# Patient Record
Sex: FEMALE | Race: WHITE
[De-identification: names, ages, dates, MRNs, and addresses within clinical notes are randomized per-mention and may not be internally consistent; named-entity substitution may affect disease eponyms.]

---

## 2017-02-28 ENCOUNTER — HOSPITAL ENCOUNTER (OUTPATIENT)
Dept: HOSPITAL 47 - WWCWWP | Age: 37
Discharge: HOME | End: 2017-02-28
Payer: COMMERCIAL

## 2017-02-28 DIAGNOSIS — Z12.31: Primary | ICD-10-CM

## 2017-02-28 PROCEDURE — 77063 BREAST TOMOSYNTHESIS BI: CPT

## 2017-02-28 NOTE — [facility]
DATE OF SERVICE:  02/28/2017 



CHIEF COMPLAINT: The patient is here for her routine gynecologic exam 

and mammogram.  



HPI: This is a 36-year-old G3, P2-0-1-2 with an LMP of 2/10/2017. She 

is status post tubal ligation. She states her periods are regular 

every month. She states they seem to be lasting longer than before and 

can last up to 10 to 12 days. She states she can have some heavier 

flow during the middle part of the period. She states these are not 

very bothersome. The patient is otherwise without complaints.  



Past medical history is unremarkable. 



MEDICATIONS: None. 



ALLERGIES: No known drug allergies. 



Past surgical and family histories are unchanged from the 2014 H&P. 



PAST GYN HISTORY: She had cryotherapy of the cervix in 1997 and has 

had normal Pap smears since then. She has no history of STDs.  



SOCIAL HISTORY: She denies tobacco, alcohol, and drug use. She has 

been  since 2007 and is now working as a mental health 

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  



REVIEW OF SYSTEMS: She has gained about 14 pounds over the last 2 

years. She denies respiratory, cardiac, or GI problems.  



PHYSICAL EXAM: 

Blood pressure 121/80. Height 5 feet 6 inches. Weight 180 pounds. 

Temperature 98.4, pulse 70. 

This is a well-developed, well-nourished white female who is alert and 

oriented x3 in no acute distress. HEENT is within normal limits.  

NECK: Supple without mass or thyromegaly. 

CHEST AND LUNGS: Clear to auscultation. 

HEART: Regular rate and rhythm. Breasts are without mass or discharge. 

Axillary exam is negative for adenopathy.  

BACK: Negative for CVA tenderness. 

ABDOMEN: Soft, nontender, without palpable masses. 

PELVIC EXAM: Normal external genitalia. Cervix and vagina appear 

normal. There is no evidence of prolapse. The uterus is multiparous, 

nongravid size and nontender. There are no palpable adnexal masses or 

tenderness. Rectal exam is negative for mass or tenderness.  

EXTREMITIES: Nontender. 



IMPRESSION: 

1. A 36-year-old female with normal gynecologic exam. 

2. Mild hypermenorrhea. 

3. She is status post tubal ligation. 



PLAN: 

1. Pap smear was performed. 

2. Self breast examination was discussed. 

3. Mammogram will be done today, and I have recommended yearly 

mammograms from here on out because of her strong family history of 

breast cancer. She believes her mother was around 40 when she 

developed breast cancer.  

4. We have discussed her prolonged menses. We have discussed various 

options, including oral contraception use as well as endometrial 

ablation. If the periods are bothersome to her, I have recommended 

that she consider endometrial ablation. She will consider this. She 

will also keep a menstrual calendar.  

5. She will return in one year and p.r.n.

## 2017-03-01 NOTE — MM
Reason for exam: screening  (asymptomatic).

Last mammogram was performed 2 years and 5 months ago.



History:

Patient has history of other cancer at age 17 and is nulliparous.

Family history of breast cancer in mother and breast cancer in maternal 

grandmother.

Excisional biopsy of the left breast.

Took hormonal contraceptives for 2 years.



Physical Findings:

A clinical breast exam by your physician is recommended on an annual basis and 

results should be correlated with mammographic findings.



MG 3D Screening Mammo W/Cad

Bilateral CC and MLO view(s) were taken.

Prior study comparison: September 19, 2014, bilateral MG diagnostic mammo w CAD 

[facility].  January 24, 2000, bilateral diagnostic mammogram.

The breast tissue is extremely dense which could obscure a lesion on mammography. 

There is no discrete abnormality.  No significant changes when compared with 

prior studies.





ASSESSMENT: Negative, BI-RAD 1



RECOMMENDATION:

Routine screening mammogram of both breasts in 1 year.

## 2018-07-11 ENCOUNTER — HOSPITAL ENCOUNTER (OUTPATIENT)
Dept: HOSPITAL 47 - LABWHC1 | Age: 38
Discharge: HOME | End: 2018-07-11
Payer: COMMERCIAL

## 2018-07-11 DIAGNOSIS — E03.9: ICD-10-CM

## 2018-07-11 DIAGNOSIS — Z13.220: ICD-10-CM

## 2018-07-11 DIAGNOSIS — Z00.01: Primary | ICD-10-CM

## 2018-07-11 LAB
ALBUMIN SERPL-MCNC: 4.3 G/DL (ref 3.5–5)
ALP SERPL-CCNC: 68 U/L (ref 38–126)
ALT SERPL-CCNC: 22 U/L (ref 9–52)
ANION GAP SERPL CALC-SCNC: 9 MMOL/L
AST SERPL-CCNC: 20 U/L (ref 14–36)
BASOPHILS # BLD AUTO: 0.1 K/UL (ref 0–0.2)
BASOPHILS NFR BLD AUTO: 1 %
BUN SERPL-SCNC: 30 MG/DL (ref 7–17)
CALCIUM SPEC-MCNC: 9.1 MG/DL (ref 8.4–10.2)
CHLORIDE SERPL-SCNC: 104 MMOL/L (ref 98–107)
CHOLEST SERPL-MCNC: 239 MG/DL (ref ?–200)
CO2 SERPL-SCNC: 26 MMOL/L (ref 22–30)
EOSINOPHIL # BLD AUTO: 0.2 K/UL (ref 0–0.7)
EOSINOPHIL NFR BLD AUTO: 3 %
ERYTHROCYTE [DISTWIDTH] IN BLOOD BY AUTOMATED COUNT: 4.63 M/UL (ref 3.8–5.4)
ERYTHROCYTE [DISTWIDTH] IN BLOOD: 15 % (ref 11.5–15.5)
GLUCOSE SERPL-MCNC: 92 MG/DL (ref 74–99)
HCT VFR BLD AUTO: 37.3 % (ref 34–46)
HDLC SERPL-MCNC: 42 MG/DL (ref 40–60)
HGB BLD-MCNC: 12.5 GM/DL (ref 11.4–16)
LDLC SERPL CALC-MCNC: 174 MG/DL (ref 0–99)
LYMPHOCYTES # SPEC AUTO: 2.4 K/UL (ref 1–4.8)
LYMPHOCYTES NFR SPEC AUTO: 40 %
MCH RBC QN AUTO: 26.9 PG (ref 25–35)
MCHC RBC AUTO-ENTMCNC: 33.4 G/DL (ref 31–37)
MCV RBC AUTO: 80.6 FL (ref 80–100)
MONOCYTES # BLD AUTO: 0.4 K/UL (ref 0–1)
MONOCYTES NFR BLD AUTO: 7 %
NEUTROPHILS # BLD AUTO: 2.8 K/UL (ref 1.3–7.7)
NEUTROPHILS NFR BLD AUTO: 47 %
PLATELET # BLD AUTO: 246 K/UL (ref 150–450)
POTASSIUM SERPL-SCNC: 3.9 MMOL/L (ref 3.5–5.1)
PROT SERPL-MCNC: 7.4 G/DL (ref 6.3–8.2)
SODIUM SERPL-SCNC: 139 MMOL/L (ref 137–145)
T4 FREE SERPL-MCNC: 0.93 NG/DL (ref 0.78–2.19)
TRIGL SERPL-MCNC: 113 MG/DL (ref ?–150)
WBC # BLD AUTO: 6 K/UL (ref 3.8–10.6)

## 2018-07-11 PROCEDURE — 85025 COMPLETE CBC W/AUTO DIFF WBC: CPT

## 2018-07-11 PROCEDURE — 84443 ASSAY THYROID STIM HORMONE: CPT

## 2018-07-11 PROCEDURE — 36415 COLL VENOUS BLD VENIPUNCTURE: CPT

## 2018-07-11 PROCEDURE — 80053 COMPREHEN METABOLIC PANEL: CPT

## 2018-07-11 PROCEDURE — 80061 LIPID PANEL: CPT

## 2018-07-11 PROCEDURE — 84439 ASSAY OF FREE THYROXINE: CPT

## 2019-03-06 ENCOUNTER — HOSPITAL ENCOUNTER (OUTPATIENT)
Dept: HOSPITAL 47 - WWCWWP | Age: 39
Discharge: HOME | End: 2019-03-06
Payer: COMMERCIAL

## 2019-03-06 VITALS
SYSTOLIC BLOOD PRESSURE: 132 MMHG | RESPIRATION RATE: 18 BRPM | TEMPERATURE: 96.8 F | HEART RATE: 76 BPM | DIASTOLIC BLOOD PRESSURE: 77 MMHG

## 2019-03-06 VITALS — BODY MASS INDEX: 30.9 KG/M2

## 2019-03-06 DIAGNOSIS — Z53.9: Primary | ICD-10-CM

## 2019-03-06 NOTE — P.HPOB
History of Present Illness


H&P Date: 19


Chief Complaint: The patient is here for her routine gynecologic exam.


This is a 38-year-old  012 with an LMP of 2019.  The patient is 

status post tubal ligation.  She states her menstrual periods are regular every 

month lasting 7 to 10 days with 4 days of heavier flow.  She states she had 

some blood clots with her menstrual period in February which were a little 

larger than usual.  She is declining any medication or treatment for her 

menstrual periods at this time.  She is aware that there are various options 

available to her.








Review of Systems





The patient has gained 6 pounds over the last year.  She denies respiratory, 

cardiac, or G.I. problems.





Past Medical History


Past Medical History: Thyroid Disorder


Additional Past Medical History / Comment(s): migrane. hypothyroid. PAST GYN 

HISTORY: She has no history of STDs.  She had cryotherapy of the cervix in 


History of Any Multi-Drug Resistant Organisms: None Reported


Past Surgical History: Breast Surgery,  Section (x2), Tubal Ligation


Additional Past Surgical History / Comment(s): lumpectomy Left breast.  D&C.  

Cryotherapy of the cervix .


Past Psychological History: No Psychological Hx Reported


Smoking Status: Never smoker


Past Alcohol Use History: None Reported


Past Drug Use History: None Reported


Additional History: She has been  since  and is a mental health 

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





- Past Family History


  ** Mother


Family Medical History: Cancer, Diabetes Mellitus


Additional Family Medical History / Comment(s): Breast cancer, colon cancer, 

cervical cancer and diabetes.  Grandmother had leukemia, colon cancer, and 

breast cancer.





  ** Sister(s)


Family Medical History: Diabetes Mellitus





Medications and Allergies


 Home Medications











 Medication  Instructions  Recorded  Confirmed  Type


 


Ibuprofen [Motrin] 800 mg PO Q8HR PRN #30 tab 16 Rx


 


Cholecalciferol [Vitamin D3] 1,000 unit PO DAILY 19 History


 


Ubidecarenone [Co Q-10] 100 mg PO DAILY 19 History











 Allergies











Allergy/AdvReac Type Severity Reaction Status Date / Time


 


No Known Allergies Allergy   Verified 19 08:03














Exam


 Vital Signs











  Temp Pulse Resp BP Pulse Ox


 


 19 08:05  96.8 F L  76  18  132/77  97








 Intake and Output











 19





 22:59 06:59 14:59


 


Other:   


 


  Weight   84.368 kg











Height 5'5", weight 186 pounds, BMI 31.0.





This is a well-developed well-nourished white female who is alert and oriented 

times 3 in no acute distress.


HEENT: Within normal limits.


NECK: Supple without mass or thyromegaly.


CHEST AND LUNGS: Clear to auscultation.


HEART: Regular rate and rhythm.


BREASTS: Are without mass or discharge.


AXILLARY EXAM: Negative for adenopathy.


BACK: Negative for CVA tenderness.


ABDOMEN: Soft, nontender, without palpable masses.


PELVIC EXAM: Normal external genitalia. Cervix and vagina appear normal. There 

is no unusual discharge.  There is no evidence of prolapse.  The uterus is mid 

to anterior position, nongravid size and nontender. There are no palpable 

adnexal masses or tenderness.


RECTAL EXAM: there is a small soft internal hemorrhoid which is nontender.  

Rectal exam is otherwise negative for mass or tenderness.


EXTREMITIES: Nontender.





IMPRESSION: 


1.  38-year-old female with normal gynecologic exam who is status post tubal 

ligation.


2.  Mild hypermenorrhea declining any treatment at this time.





PLAN: 


1.  Pap smear was performed.


2.  Self breast awareness was discussed with the patient.


3.  Screening mammogram will be done next year and yearly thereafter.  She has 

had several screening mammograms in the past because of her family history.


4.  Osteoporosis prevention was discussed.  I have stressed the importance of 

adequate calcium, vitamin D and regular exercise.  Recommended amounts of 

calcium and vitamin D were also discussed.


5.  She will keep a menstrual calendar.  We have discussed possible options for 

her hypermenorrhea including endometrial ablation.  She is declining any 

treatment at this time.


6.  She was advised to return in one year for her annual well woman exam.

## 2020-03-11 ENCOUNTER — HOSPITAL ENCOUNTER (OUTPATIENT)
Dept: HOSPITAL 47 - LABWHC1 | Age: 40
Discharge: HOME | End: 2020-03-11
Attending: INTERNAL MEDICINE
Payer: COMMERCIAL

## 2020-03-11 DIAGNOSIS — Z00.01: Primary | ICD-10-CM

## 2020-03-11 DIAGNOSIS — Z13.220: ICD-10-CM

## 2020-03-11 DIAGNOSIS — E03.9: ICD-10-CM

## 2020-03-11 LAB
ALBUMIN SERPL-MCNC: 4.5 G/DL (ref 3.8–4.9)
ALBUMIN/GLOB SERPL: 1.96 G/DL (ref 1.6–3.17)
ALP SERPL-CCNC: 80 U/L (ref 41–126)
ALT SERPL-CCNC: 10 U/L (ref 8–44)
ANION GAP SERPL CALC-SCNC: 9 MMOL/L (ref 4–12)
AST SERPL-CCNC: 14 U/L (ref 13–35)
BASOPHILS # BLD AUTO: 0.1 K/UL (ref 0–0.2)
BASOPHILS NFR BLD AUTO: 1 %
BUN SERPL-SCNC: 17 MG/DL (ref 9–27)
BUN/CREAT SERPL: 17 RATIO (ref 12–20)
CALCIUM SPEC-MCNC: 9.4 MG/DL (ref 8.7–10.3)
CHLORIDE SERPL-SCNC: 106 MMOL/L (ref 96–109)
CHOLEST SERPL-MCNC: 209 MG/DL (ref 0–200)
CO2 SERPL-SCNC: 25 MMOL/L (ref 21.6–31.8)
EOSINOPHIL # BLD AUTO: 0.2 K/UL (ref 0–0.7)
EOSINOPHIL NFR BLD AUTO: 2 %
ERYTHROCYTE [DISTWIDTH] IN BLOOD BY AUTOMATED COUNT: 4.83 M/UL (ref 3.8–5.4)
ERYTHROCYTE [DISTWIDTH] IN BLOOD: 15.6 % (ref 11.5–15.5)
GLOBULIN SER CALC-MCNC: 2.3 G/DL (ref 1.6–3.3)
GLUCOSE SERPL-MCNC: 91 MG/DL (ref 70–110)
HCT VFR BLD AUTO: 37.6 % (ref 34–46)
HDLC SERPL-MCNC: 40 MG/DL (ref 40–60)
HGB BLD-MCNC: 12 GM/DL (ref 11.4–16)
LDLC SERPL CALC-MCNC: 145.6 MG/DL (ref 0–131)
LYMPHOCYTES # SPEC AUTO: 2.8 K/UL (ref 1–4.8)
LYMPHOCYTES NFR SPEC AUTO: 33 %
MCH RBC QN AUTO: 24.8 PG (ref 25–35)
MCHC RBC AUTO-ENTMCNC: 31.9 G/DL (ref 31–37)
MCV RBC AUTO: 77.8 FL (ref 80–100)
MONOCYTES # BLD AUTO: 0.4 K/UL (ref 0–1)
MONOCYTES NFR BLD AUTO: 5 %
NEUTROPHILS # BLD AUTO: 4.8 K/UL (ref 1.3–7.7)
NEUTROPHILS NFR BLD AUTO: 57 %
PLATELET # BLD AUTO: 292 K/UL (ref 150–450)
POTASSIUM SERPL-SCNC: 4.1 MMOL/L (ref 3.5–5.5)
PROT SERPL-MCNC: 6.8 G/DL (ref 6.2–8.2)
SODIUM SERPL-SCNC: 140 MMOL/L (ref 135–145)
T4 FREE SERPL-MCNC: 1 NG/DL (ref 0.8–1.8)
TRIGL SERPL-MCNC: 117 MG/DL (ref 0–149)
VLDLC SERPL CALC-MCNC: 23.4 MG/DL (ref 5–40)
WBC # BLD AUTO: 8.4 K/UL (ref 3.8–10.6)

## 2020-03-11 PROCEDURE — 80053 COMPREHEN METABOLIC PANEL: CPT

## 2020-03-11 PROCEDURE — 36415 COLL VENOUS BLD VENIPUNCTURE: CPT

## 2020-03-11 PROCEDURE — 84443 ASSAY THYROID STIM HORMONE: CPT

## 2020-03-11 PROCEDURE — 85025 COMPLETE CBC W/AUTO DIFF WBC: CPT

## 2020-03-11 PROCEDURE — 80061 LIPID PANEL: CPT

## 2020-03-11 PROCEDURE — 84439 ASSAY OF FREE THYROXINE: CPT

## 2020-04-23 ENCOUNTER — HOSPITAL ENCOUNTER (OUTPATIENT)
Dept: HOSPITAL 47 - LABWHC1 | Age: 40
Discharge: HOME | End: 2020-04-23
Attending: INTERNAL MEDICINE
Payer: COMMERCIAL

## 2020-04-23 DIAGNOSIS — E03.9: Primary | ICD-10-CM

## 2020-04-23 DIAGNOSIS — E78.00: ICD-10-CM

## 2020-04-23 LAB
ALT SERPL-CCNC: 18 U/L (ref 8–44)
AST SERPL-CCNC: 18 U/L (ref 13–35)
CHOLEST SERPL-MCNC: 177 MG/DL (ref 0–200)
HDLC SERPL-MCNC: 44 MG/DL (ref 40–60)
LDLC SERPL CALC-MCNC: 102.2 MG/DL (ref 0–131)
T4 FREE SERPL-MCNC: 1.3 NG/DL (ref 0.8–1.8)
TRIGL SERPL-MCNC: 154 MG/DL (ref 0–149)
VLDLC SERPL CALC-MCNC: 30.8 MG/DL (ref 5–40)

## 2020-04-23 PROCEDURE — 84450 TRANSFERASE (AST) (SGOT): CPT

## 2020-04-23 PROCEDURE — 84439 ASSAY OF FREE THYROXINE: CPT

## 2020-04-23 PROCEDURE — 84460 ALANINE AMINO (ALT) (SGPT): CPT

## 2020-04-23 PROCEDURE — 80061 LIPID PANEL: CPT

## 2020-04-23 PROCEDURE — 36415 COLL VENOUS BLD VENIPUNCTURE: CPT

## 2020-04-23 PROCEDURE — 84443 ASSAY THYROID STIM HORMONE: CPT

## 2020-12-03 ENCOUNTER — HOSPITAL ENCOUNTER (OUTPATIENT)
Dept: HOSPITAL 47 - LABWHC1 | Age: 40
Discharge: HOME | End: 2020-12-03
Attending: INTERNAL MEDICINE
Payer: COMMERCIAL

## 2020-12-03 DIAGNOSIS — E03.0: ICD-10-CM

## 2020-12-03 DIAGNOSIS — Z00.01: Primary | ICD-10-CM

## 2020-12-03 DIAGNOSIS — Z13.220: ICD-10-CM

## 2020-12-03 LAB
ALBUMIN SERPL-MCNC: 4.4 G/DL (ref 3.8–4.9)
ALBUMIN/GLOB SERPL: 1.83 G/DL (ref 1.6–3.17)
ALP SERPL-CCNC: 83 U/L (ref 41–126)
ALT SERPL-CCNC: 16 U/L (ref 8–44)
ANION GAP SERPL CALC-SCNC: 5.2 MMOL/L (ref 4–12)
AST SERPL-CCNC: 17 U/L (ref 13–35)
BASOPHILS # BLD AUTO: 0.1 K/UL (ref 0–0.2)
BASOPHILS NFR BLD AUTO: 1 %
BUN SERPL-SCNC: 17 MG/DL (ref 9–27)
BUN/CREAT SERPL: 17 RATIO (ref 12–20)
CALCIUM SPEC-MCNC: 9.3 MG/DL (ref 8.7–10.3)
CHLORIDE SERPL-SCNC: 106 MMOL/L (ref 96–109)
CHOLEST SERPL-MCNC: 249 MG/DL (ref 0–200)
CO2 SERPL-SCNC: 28.8 MMOL/L (ref 21.6–31.8)
EOSINOPHIL # BLD AUTO: 0.3 K/UL (ref 0–0.7)
EOSINOPHIL NFR BLD AUTO: 4 %
ERYTHROCYTE [DISTWIDTH] IN BLOOD BY AUTOMATED COUNT: 5.18 M/UL (ref 3.8–5.4)
ERYTHROCYTE [DISTWIDTH] IN BLOOD: 16.1 % (ref 11.5–15.5)
GLOBULIN SER CALC-MCNC: 2.4 G/DL (ref 1.6–3.3)
GLUCOSE SERPL-MCNC: 95 MG/DL (ref 70–110)
HCT VFR BLD AUTO: 38.7 % (ref 34–46)
HDLC SERPL-MCNC: 49 MG/DL (ref 40–60)
HGB BLD-MCNC: 12.1 GM/DL (ref 11.4–16)
LDLC SERPL CALC-MCNC: 159.8 MG/DL (ref 0–131)
LYMPHOCYTES # SPEC AUTO: 2.1 K/UL (ref 1–4.8)
LYMPHOCYTES NFR SPEC AUTO: 32 %
MCH RBC QN AUTO: 23.3 PG (ref 25–35)
MCHC RBC AUTO-ENTMCNC: 31.1 G/DL (ref 31–37)
MCV RBC AUTO: 74.7 FL (ref 80–100)
MONOCYTES # BLD AUTO: 0.4 K/UL (ref 0–1)
MONOCYTES NFR BLD AUTO: 6 %
NEUTROPHILS # BLD AUTO: 3.7 K/UL (ref 1.3–7.7)
NEUTROPHILS NFR BLD AUTO: 56 %
PLATELET # BLD AUTO: 342 K/UL (ref 150–450)
POTASSIUM SERPL-SCNC: 4.5 MMOL/L (ref 3.5–5.5)
PROT SERPL-MCNC: 6.8 G/DL (ref 6.2–8.2)
SODIUM SERPL-SCNC: 140 MMOL/L (ref 135–145)
TRIGL SERPL-MCNC: 201 MG/DL (ref 0–149)
VLDLC SERPL CALC-MCNC: 40.2 MG/DL (ref 5–40)
WBC # BLD AUTO: 6.7 K/UL (ref 3.8–10.6)

## 2020-12-03 PROCEDURE — 84443 ASSAY THYROID STIM HORMONE: CPT

## 2020-12-03 PROCEDURE — 80061 LIPID PANEL: CPT

## 2020-12-03 PROCEDURE — 36415 COLL VENOUS BLD VENIPUNCTURE: CPT

## 2020-12-03 PROCEDURE — 80053 COMPREHEN METABOLIC PANEL: CPT

## 2020-12-03 PROCEDURE — 85025 COMPLETE CBC W/AUTO DIFF WBC: CPT

## 2021-10-19 NOTE — MM
Reason for exam: clinical finding.

Last mammogram was performed 4 years and 8 months ago.



History:

Patient has history of other cancer at age 17.

Family history of breast cancer in mother and breast cancer in maternal 

grandmother.

Benign excisional biopsy of the left breast, 1996.

Took hormonal contraceptives for 2 years.



Physical Findings:

Nurse Summary: 3 x 3cm nodule in the left breast at 10 o'clock (Dr. Roche).



MG 3D Diag Mammo W/Cad [facility]

Bilateral CC, MLO, and LM view(s) were taken.

Prior study comparison: February 28, 2017, bilateral MG 3d screening mammo w/cad. 

September 19, 2014, bilateral MG diagnostic mammo w CAD [facility].

The breast tissue is heterogeneously dense. This may lower the sensitivity of 

mammography.  Asymmetric enlarged left axillary node. Left palpable marker with 

subtle distortion particularly on the CC view left breast approximately 11 o'clock

position. Regional punctate calcifications on the left are similar.



These results were verbally communicated with the patient and result sheet given 

to the patient on 10/19/21.





ASSESSMENT: Incomplete: need additional imaging evaluation, BI-RAD 0



RECOMMENDATION:

Ultrasound of both breasts.

## 2021-10-19 NOTE — P.GSHP
History of Present Illness


H&P Date: 10/19/21


Chief Complaint: Left breast mass/BIRADS 5 left breast ultrasound





     Starr is a 41-year-old white female seen in consultation for Dr. Roche 

regarding a palpable mass in her left breast.  She states that the mass was 

noted several weeks ago and the past week and half to 2 weeks has increased in 

size.  She states that it is not related to her menstrual cycle.  She has not 

had any recent trauma or infection in the breast.  She did have a prior left 

breast biopsy near the site of the mass.  This was benign.





Caffeine: 2 cups per day


Nicotine: Negative


Birth control pills/hormones: Negative





Family history:


Motor: Bilateral breast cancer


Maternal grandmother, breast cancer


Maternal grandfather: Possible leukemia





Hormonal history:


Menarche: 15


 A1, breast fed: Yes


periods Regular





Surgical history:


Left breast lumpectomy








Medical history: Negative





Social history:


Nicotine: Negative


Alcohol: Occasional


Drugs: Negative





- Constitutional


Constitutional: Denies chills, Denies fever





- EENT


Eyes: denies blurred vision, denies pain


Ears: deny: decreased hearing, tinnitus


Ears, nose, mouth and throat: Denies headache, Denies sore throat





- Breasts


Breasts: bilateral: as per HPI





- Cardiovascular


Cardiovascular: Denies chest pain, Denies shortness of breath





- Respiratory


Respiratory: Denies cough, Denies 7





- Gastrointestinal


Gastrointestinal: Denies abdominal pain, Denies diarrhea, Denies nausea, Denies 

vomiting





- Genitourinary (Female)


Genitourinary: Denies dysuria, Denies hematuria





- Menstruation


Menstruation: Reports period normal





- Musculoskeletal


Musculoskeletal: Denies myalgias





- Integumentary


Integumentary: Denies pruritus, Denies rash





- Neurological


Neurological: Denies numbness, Denies weakness





- Psychiatric


Psychiatric: Denies anxiety, Denies depression





- Endocrine


Endocrine: Denies fatigue, Denies weight change





- Hematologic/Lymphatic


Comment: 





no bleeding abnormalities





- Allergic/Immunologic


Allergic/Immunologic: Reports as per HPI





Past Medical History


Past Medical History: Thyroid Disorder


Additional Past Medical History / Comment(s): migrane. hypothyroid. PAST GYN 

HISTORY: She has no history of STDs.  She had cryotherapy of the cervix in 


History of Any Multi-Drug Resistant Organisms: None Reported


Past Surgical History: Breast Surgery,  Section, Tubal Ligation


Additional Past Surgical History / Comment(s): lumpectomy Left breast.  D&C.  

Cryotherapy of the cervix .


Past Psychological History: No Psychological Hx Reported


Smoking Status: Never smoker


Past Alcohol Use History: Rare (2 per year)


Past Drug Use History: None Reported





- Past Family History


  ** Mother


Family Medical History: Cancer, Diabetes Mellitus


Additional Family Medical History / Comment(s): Breast cancer, colon cancer, 

cervical cancer and diabetes.  Grandmother had leukemia, colon cancer, and 

breast cancer.





  ** Sister(s)


Family Medical History: Diabetes Mellitus





Medications and Allergies


                                Home Medications











 Medication  Instructions  Recorded  Confirmed  Type


 


Ibuprofen [Motrin] 800 mg PO Q8HR PRN #30 tab 01/17/16 10/19/21 Rx


 


Ubidecarenone [Co Q-10] 100 mg PO DAILY 03/06/19 10/19/21 History


 


B Complex-Vit C-Vit E-Zinc [Z-Bec] 1 tab PO DAILY 10/19/21 10/19/21 History


 


Thyroid Complex 1 tab PO DAILY 10/19/21 10/19/21 History


 


Vitamin D2/Vitamin K 1 tab PO DAILY 10/19/21 10/19/21 History








                                    Allergies











Allergy/AdvReac Type Severity Reaction Status Date / Time


 


No Known Allergies Allergy   Verified 10/19/21 08:42














Surgical - Exam





- General


well developed, well nourished, no distress





- Eyes


normal ocular movement





- ENT


no hearing loss, no congestion





- Neck


no masses, trachea midline





- Respiratory


normal respiratory effort, clear to auscultation





- Cardiovascular


Rhythm: regular


Heart Sounds: normal: S1, S2





- Abdomen


Abdomen: soft





- Integumentary





normal turgor





- Neurologic


no disoriented, no combative





- Musculoskeletal


normal gait





- Psychiatric


oriented to time, oriented to person, oriented to place, speech is normal, 

memory intact





Breast exam:


Inspection: Grade 2 ptosis bilaterally


Palpation:


Right breast: Multiple positional exam fibrocystic changes no dominant masses or

nodules of concern


Right axilla: No adenopathy of concern


Left breast: Multiple positional exam increased mass approximately 4 x 5 cm in 

size in the upper inner quadrant no other dominant masses or nodules of concern


Left axilla: Shotty adenopathy





Results





Mammogram and ultrasound reviewed with Dr. Foote; findings revealed admission for

core biopsy in the right breast and 2 lesions in the left breast with 

recommendation of left axillary: Biopsy of the node as well





Assessment and Plan


Assessment: 





Impression:


1.  Mass left breast


2.  Abnormal left breast and right breast ultrasound


3.  Family history breast cancer





Plan:


1.  Right breast ultrasound-guided core biopsy, left breast ultrasound guided 

guarded biopsy 2 areas as well as axillary lymph node


3.  Follow-up after the biopsies





The risk and benefits of the procedure were discussed with the patient.  She 

understands and wishes to proceed.





Cc: Dr. Roche

## 2021-10-19 NOTE — USB
Reason for exam: additional evaluation requested from abnormal screening.



History:

Patient has history of other cancer at age 17.

Family history of breast cancer in mother and breast cancer in maternal 

grandmother.

Benign excisional biopsy of the left breast, 1996.

Took hormonal contraceptives for 2 years.



US Breast BILAT

Right complete breast ultrasound includes all four quadrants, the retroareolar 

region and axilla. Finding demonstrates a 1.0 x 0.6 x 0.9cm complex cyst at 3 

o'clock for which a biopsy is recommended, a 1.0 x 0.4 x 0.8cm mixed lesion at 9 

o'clock, a 0.7 x 0.6 x 0.6cm cystic cluster at 11 o'clock and a 1.5 x 1.0 x 1.6cm 

cystic cluster at 10 o'clock.



Left complete breast ultrasound includes all four quadrants, the retroareolar 

region and axilla. Finding demonstrates a 0.8 x 0.5 x 0.8cm mixed lesion at 1 

o'clock, a 1.0 x 0.5 x 1.0cm cystic cluster at 2 o'clock, a 0.8 x 0.5 x 0.9cm 

hypoechoic, solid lesion at 3 o'clock for which a biopsy is recommended, a 2.8 x 

2.0 x 3.1cm hypoechoic lesion at 10 o'clock, BI-RADS 5 for which a biopsy is 

recommended and a 1.2 x 1.0 x 1.4cm lymph node at the axilla for which a biopsy is

recommended.



These results were verbally communicated with the patient and result sheet given 

to the patient on 10/19/21.





ASSESSMENT: Highly suggestive of malignancy, BI-RAD 5



RECOMMENDATION:

Ultrasound core biopsy of both breasts.

(right breast at 3 o'clock, left breast x 3 sites)



Called Dr. Roche's office with mammographic findings and has scheduled an 

appointment for the patient for 10/19/21 at 11:20 with Dr. Doll.

Left breast biopsy scheduled for 11/3/21 at 8 o'clock.

Right breast biopsy scheduled for 11/5/21 at 10:30.



PRELIMINARY REPORT CALLED AND FAXED TO DR. DOLL ON 10/19/21.

## 2021-10-28 NOTE — USB
EXAMINATION TYPE: US biopsy breast VAD LT, US biopsy breast add'l VAD LT

 

DATE OF EXAM: 10/28/2021

 

CLINICAL HISTORY: R92.8 abnormal mammogram.

 

TECHNIQUE: Ultrasound guided core biopsy of left 10:00 breast and left axillary 
lymph node.  

 

COMPARISON: NONE

 

FINDINGS: The procedure of ultrasound guided core biopsy was explained to the 
patient.  Benefits, alternatives, and risks were discussed.  An informed consent
was then obtained.  

 

The patient was placed in supine positioning for  imaging and for the procedure.
The overlying skin was prepped and draped in usual sterile fashion.  Lidocaine 
buffered with bicarbonate was used as anesthetic into the skin and subcutaneous 
tissue up to area of concern in the 10:00 breast and left axillary lymph node.  

 

Under ultrasound guidance, a 12-gauge vacuum assisted biopsy gun device was used
to obtain 4 core samples from the 10:00 lesion and 2 samples from the left 
axillary lymph node.  Following this, a biopsy clip was left in each lesion.  
Postprocedural mammogram demonstrates appropriate clip deployment.

 

The patient tolerated the procedure well without any immediate complication.  
The patient was kept in the radiology department for short stay after the 
procedure and then discharged home in stable condition.  

 

 

 

IMPRESSION: Successful, uncomplicated ultrasound guided core biopsy of area of 
concern in the left 10:00 breast and left axillary lymph node, full pathology 
results to follow. 

 

 

 

Comment: Left 3:00 and right 3:00 lesions are scheduled to be sampled next week.

 

Pathology Results: Malignant

 

A. LEFT BREAST, 10:00, ULTRASOUND GUIDED CORE BIOPSY: Invasive high grade ductal
carcinoma with basal-like features. See Surgical Pathology Cancer Case Summary 
and comment.



B. LEFT AXILLA, CORE BIOPSY: Lymph node positive for metastatic high grade 
ductal carcinoma. Greatest dimension of metastatic deposit measures 6 mm. See 
Surgical Pathology Cancer Case Summary and Comment.  



Recommendation

Surgical consult of the left breast.

[facility]

## 2021-10-28 NOTE — MM
Reason for exam: additional evaluation requested from abnormal screening.

Last mammogram was performed less than 1 month ago.



History:

Patient has history of other cancer at age 17.

Family history of breast cancer in mother and breast cancer in maternal 

grandmother.

Benign excisional biopsy of the left breast, 1996.

Took hormonal contraceptives for 2 years.



MG Diagnostic Mammo LT Wo CAD

CC, MLO, and LM view(s) were taken of the left breast.

Prior study comparison: October 19, 2021, bilateral MG 3d diag mammo w/cad [facility].  

February 28, 2017, bilateral MG 3d screening mammo w/cad.



ASSESSMENT: Post procedure mammogram for marker placement



RECOMMENDATION:

Ultrasound of the left breast in 6 months.

PENDING PATHOLOGY RESULTS.

## 2021-11-03 NOTE — USB
EXAMINATION TYPE: 

 

US biopsy breast VAD RT, 

US biopsy breast VAD LT, 

MG postbiopsy diagnostic mammo BI wo CAD

 

DATE OF EXAM: 11/3/2021

 

CLINICAL HISTORY: 41-year-old female R92.8 ABNORMAL MAMMOGRAM. Left breast mass 
and left axillary node biopsy last week. Referred for biopsy of 2 additional 
lesions, one on each side.

 

TECHNIQUE: Ultrasound guided core biopsy of the bilateral breast.  

 

COMPARISON: 10/19/2021

 

FINDINGS: The procedure of ultrasound guided core biopsy was explained to the 
patient.  Benefits, alternatives, and risks were discussed.  An informed consent
was then obtained.  

 

The patient was placed in supine positioning for  imaging and for the procedure.
The overlying skin was prepped and draped in usual sterile fashion.  Lidocaine 
was used as anesthetic into the skin and subcutaneous tissue up to area of 
concern in the right breast.  After initial anesthetization of the skin with 
lidocaine on the left breast, mixture of lidocaine and epinephrine was used for 
deeper numbing.

 

BIOPSY #3, RIGHT, 3:00 - Cyst cluster vs complex cyst.  Under ultrasound 
guidance, a 13-gauge vacuum-assisted Mammotome Elite biopsy gun was used to 
obtain 5 core samples.  Following this, a wing clip was left in lesion.  

 

BIOPSY #4, LEFT, 3:00 - Round circumscribed mass, fibroadenoma vs papilloma vs 
other.  Under ultrasound guidance, a 13-gauge vacuum-assisted Mammotome Elite 
biopsy gun was used to obtain 6 core samples.  Following this, a coil clip was 
left in lesion.  

 

The patient tolerated the procedure well without any immediate complication.  
The patient was kept in the radiology department for short stay after the 
procedure and then discharged home in stable condition.  

 

Postprocedure mammograms shows a total 1 clip on the right and now 3 clips on 
the left. Prior wing clip at the suspicious medial left breast mass and left 
axilla.

 

 

 

IMPRESSION: 

Successful, uncomplicated ultrasound guided core biopsy of SITE #3 right breast 
(cyst cluster versus complex cyst) and SITE #4 left breast 3:00 (benign mass 
favored). Full pathology results to follow. Clips at the 10:00 more suspicious 
left breast mass and left axillary node (corresponding to SITE #1 AND SITE #2 
biopsies performed last week) are also demonstrated on the postbiopsy mammogram.

 

Pathology Results: Benign

 

A.  RIGHT BREAST, THREE O'CLOCK, CORE BIOPSY:  Fibrocystic changes with apocrine
metaplasia, columnar cell change, focal microcalcification and hemorrhage with 
prominent fragmented cystic component.  Focally suggestive of pseudoangiomatous 
stromal hyperplasia (PASH).



B.  LEFT BREAST, THREE O'CLOCK, CORE BIOPSY:  Benign fibroadenoma with stromal 
myxoid change.  Fibrocystic change with columnar cell change and focal usual 
ductal hyperplasia present.  



Recommendation

Appropriate surgical and oncologic management of biopsy proven left breast 
cancer and metastatic axillary adenopathy (on bioipsies #1 and #2 performed last
week)



MTDD

## 2021-11-04 NOTE — P.PN
Subjective


Progress Note Date: 11/04/21


Principal diagnosis: 





Stage I the left breast invasive ductal carcinoma, right breast core biopsies 

pending





Starr is 41-year-old white female status post left breast ultrasound core biopsy 

on [id number].  Pathology.  Correct position of the left breast revealed 

invasive high-grade ductal carcinoma, left axillary core biopsy revealed lymph 

node positive for metastatic high-grade ductal carcinoma.  The patient had a 

right breast core biopsy done yesterday results are pending.  The patient 

tolerated the procedure with no difficulty.





Objective





- Constitutional


General appearance: Present: cooperative





- EENT


Eyes: Present: EOMI


ENT: Present: hearing grossly normal





- Neck


Neck: Present: normal ROM





- Respiratory


Respiratory: bilateral: CTA





- Cardiovascular


Heart sounds: normal: S1, S2





- Integumentary


Integumentary: Present: normal turgor





- Musculoskeletal


Musculoskeletal: Present: gait normal





- Psychiatric


Psychiatric: Present: A&O x's 3, appropriate affect, intact judgment & insight





- Additional findings


Additional findings: 





Breast examination:


Bilateral breast core biopsy sites clean and dry no evidence of infection or 

hematoma





Assessment and Plan


Assessment: 





Impression:


Left breast invasive ductal carcinoma high grade


T2 N1 M0 ER positive ME positive HER-2 positive G3 stage IB





Plan:


Appointment with medical oncology


Presentation of case at tumor board


Genetic testing


Follow-up for results of right breast biopsy next week


Follow up here in 2 months


PET CT  R/O metastatic disease





Cc: Dr. Martinez

## 2021-11-12 NOTE — ECHOF
Referral Reason:C50.212 Breast ca



MEASUREMENTS

--------

HEIGHT: 165.1 cm

WEIGHT: 84.4 kg

BP: 

RVIDd:   2.8 cm     (< 3.3)

IVSd:   0.9 cm     (0.6 - 1.1)

LVIDd:   2.7 cm     (3.9 - 5.3)

LVPWd:   1.1 cm     (0.6 - 1.1)

IVSs:   1.8 cm

LVIDs:   1.6 cm

LVPWs:   1.4 cm

LAESV Index (A-L):   14.72 ml/m

Ao Diam:   2.8 cm     (2.0 - 3.7)

AV Cusp:   1.8 cm     (1.5 - 2.6)

LA Diam:   2.8 cm     (2.7 - 3.8)

MV EXCURSION:   19.436 mm     (> 18.000)

MV EF SLOPE:   56 mm/s     (70 - 150)

EPSS:   0.7 cm

MV E Bob:   0.81 m/s

MV DecT:   200 ms

MV A Bob:   0.73 m/s

MV E/A Ratio:   1.11 

RAP:   5.00 mmHg

RVSP:   10.99 mmHg







FINDINGS

--------

Sinus rhythm.

This was a technically adequate study.

The left ventricular size is normal.   Left ventricular wall thickness is normal.   Overall left vent
ricular systolic function is normal with, an EF between 55 - 60 %.   The diastolic filling pattern is
 normal for the age of the patient 9.39.

The right ventricle is normal in size.

Normal LA  size by volume 22+/-6 ml/m2.

The right atrial size is normal.

The aortic valve is trileaflet, and appears structurally normal. No aortic stenosis or regurgitation.


The mitral valve is normal.   Mild mitral regurgitation is present.

The tricuspid valve appears structurally normal.   Mild tricuspid regurgitation present.   Right vent
ricular systolic pressure is normal at < 35 mmHg.

There is no pulmonic regurgitation present.

The aortic root size is normal.

Normal inferior vena cava with normal inspiratory collapse consistent with estimated right atrial pre
ssure of  5 mmHg.

There is no pericardial effusion.



CONCLUSIONS

--------

1. Left ventricular wall thickness is normal.

2. Overall left ventricular systolic function is normal with, an EF between 55 - 60 %.

3. Normal LA size by volume 22+/-6 ml/m2.

4. The aortic valve is trileaflet, and appears structurally normal. No aortic stenosis or regurgitati
on.

5. Mild mitral regurgitation is present.

6. Mild tricuspid regurgitation present.

7. There is no pericardial effusion.





SONOGRAPHER: Robyn Carrillo RDCS

## 2021-11-15 NOTE — XR
EXAMINATION TYPE: XR chest 1V confirm line [facility]

 

DATE OF EXAM: 11/15/2021

 

COMPARISON: NONE

 

HISTORY: Line placement

 

TECHNIQUE: Single frontal view of the chest is obtained.

 

FINDINGS:  There is no focal air space opacity, pleural effusion, or pneumothorax seen.  The cardiac 
silhouette size is within normal limits.   The osseous structures are intact. Mediport seen with the 
tip overlying the SVC.

 

IMPRESSION:  Mediport catheter seen with tip overlying the SVC and no sizable pneumothorax.

## 2021-11-15 NOTE — P.OP
Date of Procedure: 11/15/21


Procedure(s) Performed: 


PREOPERATIVE DIAGNOSIS: Left breast cancer


POSTOPERATIVE DIAGNOSIS: Same


PROCEDURE: Port-A-Cath placement with fluoroscopic and ultrasound guidance


SURGEON: Brad


EBL: Minimal


ANESTHESIA: Sedation


COMPLICATIONS: None


OPERATIVE PROCEDURE: Patient was brought and placed on the operative table in 

the supine position.  The patient was sedated per anesthesia that time.  The 

chest and neck were prepped and draped in usual sterile fashion.  The ultrasound

probe was used to identify the location of the right internal jugular vein.  The

skin was localized with lidocaine.  The Seldinger needle was advanced into the 

IJ under ultrasound guidance.  The wire was advanced through the needle under 

fluoroscopic guidance into the superior vena cava.  A port pocket was created in

the right infraclavicular location.  The catheter was tunneled from the wire 

entrance site to the port pocket.  The port was then connected to the catheter. 

The dilator introducer was threaded over the guidewire.  The guidewire and 

dilator were then removed.  The catheter was advanced through the introducer and

introducer was then removed.  The tip was seen to be in the right atrial 

junction via fluoroscopy.  A picture of the radiograph showing the tip at the 

radial digital junction was taken.  Port was flushed with both saline and a Hep-

Lock solution.  There was good flow both in and out of the port.  The port was 

sutured in underlying tissues using 3-0 silk sutures.  The subcutaneous tissues 

were reapproximated using 3-0 Vicryl sutures and the skin at both locations 

using 4-0 Monocryl sutures.  Skin glue and sterile dressings then applied.


DISPOSITION: Stable to recovery room

## 2021-11-15 NOTE — P.GSHP
History of Present Illness


H&P Date: 11/15/21


Chief Complaint: Left breast cancer





Patient here today for Port-A-Cath placement.  Patient recently diagnosed with 

left breast cancer.  Believes she is starting chemotherapy sometime next week.  

She has not had a Port-A-Cath placed previously.





Past Medical History


Past Medical History: Cancer, Thyroid Disorder


Additional Past Medical History / Comment(s): migrane. hypothyroid. PAST GYN 

HISTORY: She has no history of STDs.  She had cryotherapy of the cervix in ,

LEFT BREAST CANCER,


History of Any Multi-Drug Resistant Organisms: None Reported


Past Surgical History: Breast Surgery,  Section, Tubal Ligation


Additional Past Surgical History / Comment(s): Left breast SURGICAL BIOPSY   

D&C.  Cryotherapy of the cervix . Left breast biopsy 10/28/21


Past Anesthesia/Blood Transfusion Reactions: No Reported Reaction


Smoking Status: Never smoker





- Past Family History


  ** Mother


Family Medical History: Cancer, Diabetes Mellitus


Additional Family Medical History / Comment(s): Breast cancer, colon cancer, 

cervical cancer and diabetes.  Grandmother had leukemia, colon cancer, and 

breast cancer.





  ** Sister(s)


Family Medical History: Diabetes Mellitus





Medications and Allergies


                                Home Medications











 Medication  Instructions  Recorded  Confirmed  Type


 


Ubidecarenone [Co Q-10] 100 mg PO DAILY 03/06/19 11/15/21 History


 


Thyroid Complex 1 tab PO DAILY 10/19/21 11/15/21 History


 


Cholecalciferol (Vitamin D3) 125 mcg PO DAILY 11/12/21 11/15/21 History





[Vitamin D3 (125 MCG = 5,000 IU)]    


 


Gut Support Complex 1 tab PO BID 11/12/21 11/15/21 History


 


Vitamin K2 90 mcg PO DAILY 11/12/21 11/15/21 History








                                    Allergies











Allergy/AdvReac Type Severity Reaction Status Date / Time


 


No Known Allergies Allergy   Verified 11/15/21 07:20














Surgical - Exam


                                   Vital Signs











Temp Pulse Resp BP Pulse Ox


 


 98.2 F   84   16   135/75   98 


 


 11/15/21 07:00  11/15/21 07:00  11/15/21 07:00  11/15/21 07:00  11/15/21 07:00

















Physical exam:


General: Well-developed, well-nourished


HEENT: Normocephalic, sclerae nonicteric


Abdomen: Nontender, nondistended


Extremities: No edema


Neuro: Alert and oriented





Assessment and Plan


(1) Breast cancer, left


Narrative/Plan: 


Will proceed with Port-A-Cath placement. Risks of bleeding, infection, DVT, 

pneumothorax, catheter malfunction, anesthesia related complications were 

discussed.  The patient understands and wishes to proceed.


Current Visit: Yes   Status: Acute   Code(s): C50.912 - MALIGNANT NEOPLASM OF 

UNSPECIFIED SITE OF LEFT FEMALE BREAST   SNOMED Code(s): 300620404

## 2021-11-15 NOTE — FL
EXAMINATION TYPE: FL guided central line placemt

 

HISTORY: Fluoroscopy time

 

Impression:

1. Fluoroscopy support provided to the referring physician.

## 2021-11-16 NOTE — CT
EXAMINATION TYPE: CT ChestAbdPelvis w con

 

DATE OF EXAM: 11/16/2021

 

COMPARISON:

 

HISTORY: Breast Cancer, observe for mets

 

CT DLP: 1933 mGycm

Automated exposure control for dose reduction was used.

 

CONTRAST: 

CT scan of the chest, abdomen and pelvis is performed with Oral Contrast and with IV Contrast, patien
t injected with 100 ml mL of Isovue 300.

 

FINDINGS:

 

LUNGS: There is a 5 mm subpleural nodule in the right lower lobe likely benign. Additional 2 mm subpl
eural nodule right lower lobe. No consolidation or pleural effusion. No pneumothorax.

 

MEDIASTINUM: There are no greater than 1 cm hilar or mediastinal lymph nodes.   No pericardial effusi
on is seen.  

 

OTHER:  No additional significant abnormality is seen.

 

LIVER/GB: No significant abnormality is appreciated.

 

PANCREAS: No significant abnormality is seen.

 

SPLEEN: Splenic hypodensities are too small to characterize but likely related to cysts.

 

ADRENALS: No significant abnormality is seen.

 

KIDNEYS: 5 mm hypodensity left kidney too small to characterize but likely related to a cyst..

 

BOWEL:  No significant abnormality is seen.

 

 

LYMPH NODES: No greater than 1 cm abdominal or pelvic lymph nodes are appreciated.

 

OSSEOUS STRUCTURES: No significant abnormality is seen.

 

OTHER: There is be hyperdense mass within the left breast measuring 2.4 cm. Surgical clip suspected. 
There is shotty adenopathy in the axilla. There is marked heterogeneity to the uterus with multiple s
uspected uterine masses. There is a large exophytic lesion measuring 5 cm. Mediport catheter noted.

 

IMPRESSION: 

1. Sub-5 mm subpleural nodules right lower lobe likely benign.

2. No pathologic adenopathy.

3. Hyperdense mass left breast as discussed above.

4. Marked abnormal appearance of the uterus with a large 5 cm exophytic mass likely uterine. Recommen
d ultrasound to exclude ovarian etiology. Findings could represent fibroid uterus. Other etiologies n
ot excluded.

## 2021-11-16 NOTE — NM
EXAMINATION TYPE: NM bone scan whole body

 

DATE OF EXAM: 11/16/2021

 

COMPARISON: CT same date

 

HISTORY: C50.212 Breast CA Z03.89 Obs for suspected mets

 

Delayed whole-body scanning was performed following the injection of 23 mCi Tc 99m MDP.  Images acqui
red 3 hours post injection.

 

FINDINGS: 

 

Uptake within the knees, wrists, elbows, shoulders, sternoclavicular joints is likely degenerative. S
oft tissue uptake is normal. No areas of abnormal increased or decreased uptake to suggest metastatic
 disease.

 

IMPRESSION: 

 

No evident metastatic disease.

## 2021-11-21 NOTE — US
EXAMINATION TYPE: US pelvis complete transvag

 

DATE OF EXAM: 11/19/2021

 

COMPARISON: CT 11/16/2021

 

CLINICAL HISTORY: 41-year-old female Z03.89 OBSERVATION FOR SUSPECTED METS. Mass seen on recent CT, g
ravida 3, para 2, miscarriage 1, history of 2 c-sections and tubal ligation

 

TECHNIQUE: .  Transabdominal sonographic images of the pelvis were acquired.  Transvaginal sonographi
c images were medically necessary to better assess the following anatomy: endometrium

 

Date of LMP:  11/17/2021

 

EXAM MEASUREMENTS:

 

Uterus:  12.4 x 5.8 x 6.6  cm

Endometrial Stripe: 0.6 cm

Right Ovary:  3.3 x 2.0 x 1.5 cm

Left Ovary:  2.9 x 1.7 x 2.1 cm

 

 

 

1. Uterus:  anteverted, enlarged, heterogeneous with 5.6 x 4.9 x 5.3cm exophytic fundal hypoechoic ar
ea, compatible with the mass seen on CT.

2. Endometrium:   3.9cm heterogeneous oval area seen within endocervical canal, possible blood clot, 
prolapsed fibroid, or other mass. An adjacent 1.5 cm cervical nabothian cyst is noted. The endometria
l stripe itself is normal.

3. Right Ovary:  wnl

4. Left Ovary:  wnl

5. Bilateral Adnexa:  wnl

6. Posterior cul-de-sac:  wnl

 

 

 

IMPRESSION: 

1. Recommend direct visualization and possible Pap smear. There is a 3.9 cm oval heterogeneous area a
long the endocervical canal. This could represent blood clot, prolapsed fibroid, or other mass. Furth
er evaluation is recommended.

2. Bulky fibroid uterus. There is a 5.6 cm exophytic subserosal pedunculated fibroid from the fundus 
of the uterus.

## 2021-12-01 NOTE — P.PCN
Date of Procedure: 12/01/21


Preoperative Diagnosis: 


Menorrhagia and abnormal heterogeneous mass along the cervical canal.


Postoperative Diagnosis: 


Same.


Procedure(s) Performed: 


Endometrial biopsy.


Anesthesia: none


Surgeon: Reuben Roche


Estimated Blood Loss (ml): 1


Pathology: other (Endometrial and endocervical tissue.)


Condition: stable


Disposition: same day


Indications for Procedure: 


This was a 41-year-old female who was recently diagnosed with left breast 

cancer.  In the workup she underwent a CT scan of the chest, abdomen, and 

pelvis.  The CT scan showed an exophytic mass that was believed to come from the

top of the uterus.  A pelvic ultrasound was done as follow-up and this showed a 

subserosal pedunculated fibroid and an abnormal 3.9 cm oval heterogeneous 

complex area along the endocervical canal.  The patient also has a history of 

long menstrual periods lasting 11-14 days.  Recent Pap smear on 10/19/2021 was 

negative and high-risk HPV testing was also negative.  The decision was made to 

do an endometrial biopsy.


Operative Findings: 


Bimanual examination reveals a bulky uterus which is firm and approximately 10-

12 weeks' size consistent with a fibroid uterus.  The uterus is nontender.  The 

cervix is wider than average and there is no cervical motion tenderness.  There 

are no palpable adnexal masses or tenderness.  The endometrial biopsy instrument

went in to 4.5 cm.  A small to moderate amount of tissue was obtained.  There is

also some clear mucus also obtained.


Description of Procedure: 


Please also refer to the progress note from earlier today.  The findings from 

the ultrasound was again reviewed with the patient.  We have discussed the 

procedure of endometrial biopsy and we also discussed possible risks with this 

procedure including bleeding, infection and uterine perforation.  All of her 

questions were answered.





The patient was placed in the lithotomy position and I manual examination was 

performed.  The uterus was somewhat bulky and firm approximately 10-12 weeks 

size.  This seems consistent with a fibroid uterus.  The cervix is wider than 

average.  There is no cervical motion tenderness.  The cervix is closed.  A 

speculum was inserted and the cervix and vagina were prepped with Betadine 

solution.  An Allis clamp was placed on the anterior lip of the cervix.  The 3mm

endometrial biopsy instrument was placed to a depth of 4.5 cm and could not be 

advanced easily beyond this.  With various simple cervical manipulation, the 

instrument could not be advanced anymore.  Negative pressure was applied and a 

back-and-forth rotating motion was used.  The first time this was done clear 

mucousy material was obtained.  The sampling was repeated twice.  A small to 

moderate amount of bloody tissue was obtained.  The instruments were removed.  

The patient tolerated the procedure well.





Postprocedure blood pressure was 113/80, pulse 77 and pulse oximeter 99%.  The 

patient was instructed to call if she has problems including heavy bleeding, 

unusual pain, fever or other problems.





Discussion: The sampling of tissue may have been from the lower uterine segment 

and endocervical canal.  It does not seem that this reached the fundus with the 

ability to advance the instrument to 4.5 cm only.  If the sampling pathology is 

nondiagnostic, we will consider referring her for hysteroscopy and D&C for 

further evaluation of the mass along the endocervical canal.

## 2021-12-01 NOTE — P.PN
Progress Note - Text


Progress Note Date: 12/01/21





The patient was found to have a left breast mass and she did undergo a breast 

biopsy on 10/28/2021 which showed invasive high-grade ductal carcinoma.  Her 

oncologist, Dr. Martinez, ordered a CT scan of the chest, abdomen and pelvis.  This

was done on 11/16/2021 and showed a 5 cm exophytic mass coming from the uterus, 

or possibly from the ovary.  Pelvic ultrasound was recommended.  Pelvic 

ultrasound was done on 11/19/2021 which showed a bulky fibroid uterus with a 5.6

cm exophytic subserosal pedunculated fibroid from the fundus of the uterus.  

There was also a 3.9 cm full heterogeneous area along the endocervical canal. I 

have reviewed the images and this appears complex with some cystic components.  

The endometrial stripe was 6 mm and considered normal. 





I have discussed these findings by phone with the patient today.  She states her

menstrual periods have been about monthly and since February they have been 

lasting about 11-14 days with 1-2 days of heavier flow and the rest of the days 

10 to be lighter or spotting.  Her LMP was 11/17/2021 which she states was a bit

abnormal in that after a couple of days of flow she tended to have more mucousy 

tissue rather than blood.  The patient will be seen in my office today at 11 AM 

to reexamine the patient and to possibly do a endometrial biopsy.





Additional studies: Pap smear cotest was done on 10/19/2021 and was negative 

with a negative high-risk HPV test.





Impression:


1.  41-year-old perimenopausal female who is status post tubal ligation with 

hypermenorrhea involving prolonged menstrual periods lasting 11-14 days.


2.  Fibroid uterus by pelvic ultrasound with a 3.9 cm oval heterogeneous complex

area along the endocervical canal.  Differential diagnosis will include a 

prolapsed uterine fibroid, endometrial or endocervical polyp, blood clot, or 

other masses.


3.  Recent diagnosis of left breast cancer.





Plan:


1.  The patient will be coming in to be seen this morning for further evaluation

and possible endometrial biopsy.


2.  We will consider referral for possible hysteroscopy and D&C or other 

indicated procedures or treatment.

## 2022-02-16 NOTE — ECHOF
Referral Reason:Z01.818 pre procedureal



MEASUREMENTS

--------

HEIGHT: 165.1 cm

WEIGHT: 78.9 kg

BP: 

RVIDd:   2.9 cm     (< 3.3)

IVSd:   1.1 cm     (0.6 - 1.1)

LVIDd:   3.3 cm     (3.9 - 5.3)

LVPWd:   1.4 cm     (0.6 - 1.1)

IVSs:   1.4 cm

LVIDs:   2.8 cm

LVPWs:   1.6 cm

LA Diam:   3.1 cm     (2.7 - 3.8)

LAESV Index (A-L):   17.32 ml/m

Ao Diam:   2.5 cm     (2.0 - 3.7)

LA Diam:   3.4 cm     (2.7 - 3.8)

MV EXCURSION:   18.919 mm     (> 18.000)

MV EF SLOPE:   58 mm/s     (70 - 150)

EPSS:   0.1 cm

MV E Bob:   0.55 m/s

MV DecT:   187 ms

MV A Bob:   0.93 m/s

MV E/A Ratio:   0.59 

RAP:   5.00 mmHg

RVSP:   13.21 mmHg







FINDINGS

--------

Sinus rhythm.

This was a technically adequate study.

LV size, wall thickness and systolic function are normal, with an EF greater than 55%.   The left martha
tricular size is normal.   The diastolic filling pattern is normal for the age of the patient 8.17.

The right ventricle is normal in size.

The left atrial size is normal.

The right atrial size is normal.

The aortic valve is trileaflet, and appears structurally normal. No aortic stenosis or regurgitation.


The mitral valve is normal.   Mild mitral regurgitation is present.

The tricuspid valve appears structurally normal.   Mild tricuspid regurgitation present.   Right vent
ricular systolic pressure is normal at < 35 mmHg.

There is no pulmonic regurgitation present.

The aortic root size is normal.

There is a small, generalized pericardial effusion present.



CONCLUSIONS

--------

1. LV size, wall thickness and systolic function are normal, with an EF greater than 55%.

2. The left atrial size is normal.

3. The aortic valve is trileaflet, and appears structurally normal. No aortic stenosis or regurgitati
on.

4. Mild mitral regurgitation is present.

5. Mild tricuspid regurgitation present.

6. There is a small, generalized pericardial effusion present.





SONOGRAPHER: Ramona Servin RDCS

## 2022-04-22 NOTE — MM
EXAMINATION TYPE: MG pre op needle loc LT

 

DATE OF EXAM: 4/22/2022

 

COMPARISON: Ultrasound 11/3/2021, mammogram 10/28/2021

 

CLINICAL HISTORY: Abnormal mammogram, abnormal pathology

 

TECHNIQUE: Needle localization with wire placement and surgical excision of area
of concern in the left breast.  

 

FINDINGS: The procedure of needle localization with wire placement for surgical 
excision was explained to the patient.  Risk, benefits, and alternatives were 
discussed.  An informed consent was then obtained.

 

A timeout was performed.

 

The overlying skin was prepped and draped in usual sterile fashion.  Lidocaine 
1% was used as anesthetic into the skin and subcutaneous tissue up to the level 
of area of concern.  A 7 cm needle was used.  This was placed via a medial 
approach under mammographic guidance.  Subsequent 90 degrees mammogram show the 
needle to be in satisfactory position relative to the targeted area.  The wire 
was placed through the needle and the needle was withdrawn.  The wire was fixed 
to patient's skin.  Images were marked for surgeon.  

 

The patient tolerated the procedure well without any immediate complication.

 

Specimen: The wire and the targeted core marker is identified within the 
specimen mammogram.

 

IMPRESSION:

1. Successful wire localization and excision.

 

Recommendations:

1. Recommendations are pending pathology results.

 

Pathology Results: Malignant



A. LEFT SENTINEL LYMPH NODE, BIOPSY: Three total lymph nodes, one node with 
treatment related changes including fibrosis and granulomatoid histiocyte 
aggregates. No residual viable metastatic tumor is identified. CK7 
immunoperoxidase stain is confirmatory (control appropriate).



B. LEFT BREAST, LUMPECTOMY: Scar with fibrosis and chronic inflammation 
consistent with neoadjuvant treatment related changes and fibrocystic changes. 
No residual malignant neoplasm is identified. See Surgical Pathology Cancer Case
Summary.



Recommendation

Follow up mammogram of the left breast in 6 months.

Malignancy treated. Appropriate oncologic management.

[facility]

## 2022-04-22 NOTE — MM
EXAMINATION TYPE: US breast localization LT

 

DATE OF EXAM: 4/22/2022

 

COMPARISON: Ultrasound 11/3/2021

 

CLINICAL HISTORY: Abnormal biopsy results

 

TECHNIQUE: Ultrasound-guided Needle localization with wire placement and 
surgical excision of area of concern in the left axilla

 

FINDINGS: The procedure of needle localization with wire placement for surgical 
excision was explained to the patient.  Risk, benefits, and alternatives were 
discussed.  An informed consent was then obtained.

 

A timeout was performed.

 

The overlying skin was prepped in usual sterile fashion.  Lidocaine 1% was used 
as anesthetic into the skin and subcutaneous tissue up to the level of area of 
concern.  A 5 cm needle was used. Needle was placed through the lymph node with 
the identified marker present. Wire was placed through the needle under 
ultrasound guidance. The wire was fixed to patient's skin. Ultrasound image was 
transferred for surgeon.  

 

The patient tolerated the procedure well without any immediate complication.

 

Specimen: The wire and the targeted marker are identified within the specimen 
mammogram.

 

IMPRESSION:

1. Successful wire localization and excision.

 

Recommendations:

1. Recommendations are pending pathology results.

 

Pathology Results: Malignant



A. LEFT SENTINEL LYMPH NODE, BIOPSY: Three total lymph nodes, one node with 
treatment related changes including fibrosis and granulomatoid histiocyte 
aggregates. No residual viable metastatic tumor is identified. CK7 
immunoperoxidase stain is confirmatory (control appropriate).



B. LEFT BREAST, LUMPECTOMY: Scar with fibrosis and chronic inflammation 
consistent with neoadjuvant treatment related changes and fibrocystic changes. 
No residual malignant neoplasm is identified. See Surgical Pathology Cancer Case
Summary.



Recommendation

Follow up mammogram of the left breast in 6 months.

Malignancy treated. Appropriate oncologic management.

CORNELIUS

## 2022-04-22 NOTE — NM
EXAMINATION TYPE: NM sentinel node injection

 

DATE OF EXAM: 4/22/2022

 

COMPARISON: NONE

 

INDICATION: Abnormal mammogram.

 

Informed consent was obtained.  A timeout was performed.  

 

The area around the left nipple was cleansed with alcohol.   In a single dose, a total of 85 uCi Tech
netium 99m Tilmanocept was injected.  The patient tolerated the procedure very well.  

 

IMPRESSIONS:

 

1. Successful injection for sentinel node evaluation.

## 2022-04-22 NOTE — MM
EXAMINATION TYPE: MG pre op needle loc LT

 

DATE OF EXAM: 4/22/2022

 

COMPARISON: Ultrasound 11/3/2021, mammogram 10/28/2021

 

CLINICAL HISTORY: Abnormal mammogram, abnormal pathology

 

TECHNIQUE: Needle localization with wire placement and surgical excision of area
of concern in the left breast.  

 

FINDINGS: The procedure of needle localization with wire placement for surgical 
excision was explained to the patient.  Risk, benefits, and alternatives were 
discussed.  An informed consent was then obtained.

 

A timeout was performed.

 

The overlying skin was prepped and draped in usual sterile fashion.  Lidocaine 
1% was used as anesthetic into the skin and subcutaneous tissue up to the level 
of area of concern.  A 7 cm needle was used.  This was placed via a medial 
approach under mammographic guidance.  Subsequent 90 degrees mammogram show the 
needle to be in satisfactory position relative to the targeted area.  The wire 
was placed through the needle and the needle was withdrawn.  The wire was fixed 
to patient's skin.  Images were marked for surgeon.  

 

The patient tolerated the procedure well without any immediate complication.

 

Specimen: The wire and the targeted core marker is identified within the 
specimen mammogram.

 

IMPRESSION:

1. Successful wire localization and excision.

 

Recommendations:

1. Recommendations are pending pathology results.

 

Pathology Results: Malignant



A. LEFT SENTINEL LYMPH NODE, BIOPSY: Three total lymph nodes, one node with 
treatment related changes including fibrosis and granulomatoid histiocyte 
aggregates. No residual viable metastatic tumor is identified. CK7 
immunoperoxidase stain is confirmatory (control appropriate).



B. LEFT BREAST, LUMPECTOMY: Scar with fibrosis and chronic inflammation 
consistent with neoadjuvant treatment related changes and fibrocystic changes. 
No residual malignant neoplasm is identified. See Surgical Pathology Cancer Case
Summary.



Recommendation

Follow up mammogram of the left breast in 6 months.

Malignancy treated. Appropriate oncologic management.

[facility]

## 2022-04-22 NOTE — P.OP
Date of Procedure: 04/22/22


Procedure(s) Performed: 


PREOPERATIVE DIAGNOSIS: Left breast cancer


POSTOPERATIVE DIAGNOSIS: Same


PROCEDURE: Left Breast wire localization lumpectomy with sentinel lymph node 

biopsy


SURGEON: Brad


EBL: 20 mL


ANESTHESIA: General


COMPLICATIONS: None


OPERATIVE PROCEDURE: Patient was placed on the operating room table in the 

supine position.  2 mL of methylene blue was injected into the subareolar space.

 The breast was then massaged for 5 minutes.  The breast was prepped and draped 

in usual sterile fashion.  The left axilla was addressed at that time.  The 

patient had a preoperative localization of a positive node using a wire.  This 

wire was entering the axilla somewhat inferior.  The hot spot in the left axilla

was identified.  A small curvilinear incision was made using the scalpel.  

Dissection down through the subcutaneous tissues took place using 

electrocautery.  The wire was brought out through the incision site at that 

time.  The wire was followed down to a blue colored radioactively positive lymph

node that was excised.  This had an adjacent small node as well.  A total of 3 

more blue radioactive nodes were then seen and removed in a similar fashion.  

All of these had benign characteristics by exam.  These were sent for permanent 

sectioning after clip was confirmed to be present within the previously biopsied

lymph node.  No additional radioactivity was identified in the axilla at that 

point.  The surgical site was inspected and no bleeding was seen.  The 

subcutaneous tissues were closed using 3-0 Vicryl sutures.  The skin was closed 

using 4-0 Monocryl sutures.  The wire entrance site was then addressed.  This 

was present at the 10:00 location.  A curvilinear incision was made adjacent to 

the areola.  Subcutaneously I dissected superficially until the wire was seen.  

The wire was then brought out through this incision site.  I followed the wire 

down into the breast tissue.  An adequate lumpectomy specimen then took place 

around the wire.  Margins of 1.5-2 cm worth attempted to be achieved.  Palpation

of the specimen did not suggest the presence of any close margins.  The specimen

was also painted the appropriate 6 colors.  Clips were used to identify the 

lumpectomy cavity.  The clip was confirmed to be within the lumpectomy specimen 

by radiology.  The subcutaneous tissues were closed using 3-0 Vicryl sutures.  

The skin was closed using a running 4-0 Monocryl stitch.  Skin glue was then 

applied.


DISPOSITION: Stable to recovery room

## 2022-06-02 NOTE — CA
Transthoracic Echo Report 

 Name: Starr Ureña 

 MRN:    [id number] 

 Age:    41     Gender:     F 

 

 :    1980 

 Exam Date:     2022 08:07 

 Exam Location: [facility] 

 Ht (in):     66     Wt (lb):     160 

 Ordering Physician:        Mounika Martinez MD 

 Attending/Referring Phys: 

 Technician         Katelin Miles RDCS 

 Procedure CPT: 

 Indications:       Z01.818 Chemo 

 

 Cardiac Hx: 

 Technical Quality:      Good 

 Contrast 1:                                Total Dose (mL): 

 Contrast 2:                                Total Dose (mL): 

 

 MEASUREMENTS  (Male / Female) Normal Values 

 2D ECHO 

 LV Diastolic Diameter PLAX        4.4 cm                4.2 - 5.9 / 3.9 - 5.3 cm 

 LV Systolic Diameter PLAX         2.5 cm                 

 IVS Diastolic Thickness           1.0 cm                0.6 - 1.0 / 0.6 - 0.9 cm 

 LVPW Diastolic Thickness          1.0 cm                0.6 - 1.0 / 0.6 - 0.9 cm 

 LV Relative Wall Thickness        0.5                    

 RV Internal Dim ED PLAX           3.0 cm                 

 LA Systolic Diameter LX           2.9 cm                3.0 - 4.0 / 2.7 - 3.8 cm 

 LA Volume                         42.1 cm???              18 - 58 / 22 - 52 cm??? 

 

 M-MODE 

 Aortic Root Diameter MM           2.8 cm                 

 MV E Point Septal Separation      0.3 cm                 

 AV Cusp Separation MM             1.9 cm                 

 

 DOPPLER 

 AV Peak Velocity                  138.9 cm/s             

 AV Peak Gradient                  7.7 mmHg               

 MV Area PHT                       3.2 cm???                

 Mitral E Point Velocity           100.4 cm/s             

 Mitral A Point Velocity           65.3 cm/s              

 Mitral E to A Ratio               1.5                    

 MV Deceleration Time              240.2 ms               

 MV E' Velocity                    12.7 cm/s              

 Mitral E to MV E' Ratio           7.9                    

 

 

 FINDINGS 

 Left Ventricle 

 Left ventricular ejection fraction is estimated at 60-65 %. Left ventricular  

 cavity size normal. Left ventricular wall thickness normal. 

 

 Right Ventricle 

 Normal right ventricular size and function. Unable to estimate the right  

 ventricular systolic pressure. 

 

 Right Atrium 

 Normal right atrial size. 

 

 Left Atrium 

 Normal left atrial size. No evidence for an atrial septal defect. 

 

 Mitral Valve 

 Structurally normal mitral valve. No mitral stenosis, regurgitation or  

 prolapse. 

 

 Aortic Valve 

 Trileaflet aortic valve. No aortic valve stenosis or regurgitation. 

 

 Tricuspid Valve 

 Trace tricuspid regurgitation. 

 

 Pulmonic Valve 

 Trace pulmonic regurgitation. 

 

 Pericardium 

 Normal pericardium. 

 

 Aorta 

 Normal size aortic root and proximal ascending aorta. 

 

 CONCLUSIONS 

 Normal left ventricular ejection fraction 60-65% 

 Trace tricuspid regurgitation 

 Normal left ventricular thickness 

 No pericardial effusion 

 Previewed by:  

 Dr. Hugo Ward DO 

 (Electronically Signed) 

 Final Date:      2022 10:06

## 2022-06-13 NOTE — USB
Reason for Exam: Clinical finding. 





Patient History: 

Menarche at age 13. First Full-Term Pregnancy at age 21. Other cancer, age 17. Breast cancer, age

41. Patient used Hormonal Contraceptives for 2 years. 04/22/2022, Lumpectomy on the Left side. 1996,

Benign Excisional Biopsy on the left side. Malignant Core Biopsy. 4/22/2022, Malignant Core Biopsy

on the left side. 11/3/2021, Benign Core Biopsy on the left side. 11/3/2021, Benign Core Biopsy on

the right side. 10/28/2021, Malignant Core Biopsy on the left side. 10/28/2021, Malignant Core

Biopsy on the left side.

Maternal grandmother had breast cancer. Mother had breast cancer. 

Technique: 

Method: Whole Breast Handheld.  





Prior Study Comparison: 

10/19/2021 Bilateral Diagnostic Mammogram, [facility]. 10/28/2021 Left Diagnostic Mammogram, [facility]. 11/3/2021

Bilateral Diagnostic Mammogram, [facility]. 





Findings: 

The whole breast of the left breast, the axilla of the left breast and the retroareolar of the left

breast were scanned.

Postbiopsy changes are within the left breast at the 10:00 region. This area is estimated to measure

2.8 x 1.7 cm. No discrete suspicious mass. The palpable hardness is at the lumpectomy site where

radiation has recently begun treatment.



Additionally, there is a 0.6 x 0.5 x 0.7 cm cyst at the 1:00 position 4 cm from the nipple. An

additional cyst 8 cm the nipple is a 2:00 position measuring 1.0 x 0.9 x 1.0 cm.. 





Overall Assessment: Probably benign, BI-RAD 3





Management: 

Diagnostic Breast Ultrasound of the left breast in 3 months.

A clinical breast exam by your physician is recommended on an annual basis and results should be

correlated with mammographic findings.

Patient should continue monthly self breast exam. Patient to return before her scheduled follow-up

if findings are clinically changing.



Electronically signed and approved by: Gunner Sun D.O. Radiologis

## 2022-09-07 NOTE — P.HPOB
History of Present Illness


H&P Date: 22


Chief Complaint: Breast Cancer





42 year old  presents for TLH BSO using da cruz and diagnostic cystoscopy 

due to ER/NC + breast cancer. 





Review of Systems


All systems: negative


Constitutional: Denies chills, Denies fever


Eyes: denies blurred vision, denies pain


Ears, nose, mouth and throat: Denies headache, Denies sore throat


Cardiovascular: Denies chest pain, Denies shortness of breath


Respiratory: Denies cough


Gastrointestinal: Denies abdominal pain, Denies diarrhea, Denies nausea, Denies 

vomiting


Genitourinary: Denies dysuria, Denies hematuria


Musculoskeletal: Denies myalgias


Integumentary: Denies pruritus, Denies rash


Neurological: Denies numbness, Denies weakness


Psychiatric: Denies anxiety, Denies depression


Endocrine: Denies fatigue, Denies weight change





Past Medical History


Past Medical History: Cancer, Thyroid Disorder


Additional Past Medical History / Comment(s): Migranes. Hypothyroid. Left breast

cancer diagnosed , has received chemo, last treatment was 3/21/22.


History of Any Multi-Drug Resistant Organisms: None Reported


Past Surgical History: Breast Surgery,  Section, Tubal Ligation


Additional Past Surgical History / Comment(s): Left breast biopsy X2, D&C, 

cryotherapy of the cervix . LEFT LUMPECTOMY


Past Anesthesia/Blood Transfusion Reactions: No Reported Reaction


Smoking Status: Never smoker





- Past Family History


  ** Mother


Family Medical History: Cancer, Diabetes Mellitus


Additional Family Medical History / Comment(s): Breast cancer, colon cancer, 

cervical cancer and diabetes. Grandmother had leukemia, colon cancer, and breast

cancer.





  ** Sister(s)


Family Medical History: Diabetes Mellitus





Medications and Allergies


                                Home Medications











 Medication  Instructions  Recorded  Confirmed  Type


 


Ubidecarenone [Co Q-10] 100 mg PO DAILY 19 History


 


Thyroid Complex 1 tab PO DAILY 10/19/21 09/01/22 History


 


Cholecalciferol (Vitamin D3) 125 mcg PO DAILY 21 History





[Vitamin D3 (125 MCG = 5,000 IU)]    


 


Gut Support Complex 1 tab PO BID 21 History


 


Vitamin K2 90 mcg PO DAILY 21 History


 


Herceptin 1 dose INJ Q21D 22 History


 


Pertuzumab [Perjeta] 0 mg IV Q21D 22 History


 


Krill Oil 1,000 mg PO DAILY 22 History


 


Multivitamins, Thera [Multivitamin 1 tab PO DAILY 22 History





(formulary)]    


 


Mushroom Complex 1 tab PO DAILY 22  History


 


Thyroid,Pork [[place] Thyroid] 60 mg PO DAILY 22 History


 


Vitamin E 400 unit PO DAILY 22 History








                                    Allergies











Allergy/AdvReac Type Severity Reaction Status Date / Time


 


No Known Allergies Allergy   Verified 22 15:17














Exam


Osteopathic Statement: *.  No significant issues noted on an osteopathic 

structural exam other than those noted in the History and Physical/Consult.





HEart: RRR


Lungs: CTAB


Abdomen: soft, nontender


Extremeties: neg neeru's





Assessment and Plan


(1) Breast cancer


Status: Acute   Code(s): C50.919 - MALIGNANT NEOPLASM OF UNSP SITE OF 

UNSPECIFIED FEMALE BREAST   SNOMED Code(s): 490696600


   


Plan: 





1. [facility] BSO with da cruz and diagnostic cystoscopy

## 2022-09-08 NOTE — P.OP
Date of Procedure: 09/08/22


Preoperative Diagnosis: 


1. ER/NE + breast cancer


2. fibroid uterus


3. menorrhagia


Postoperative Diagnosis: 


1. ER/NE + breast cancer


2. fibroid uterus


3. menorrhagia


Procedure(s) Performed: 


total laparoscopic hysterectomy and bilateral salpingo-oophorectomy using da 

Yesy and diagnostic cystoscopy


Anesthesia: ALVARO


Surgeon: Cyndi Scott


Assistant #1: Bonnie Vitale


Estimated Blood Loss (ml): 100


IV fluids (ml): 600


Urine output (ml): 400


Pathology: other (uterus, cervix, bilateral tubes and ovaries)


Condition: stable


Disposition: PACU


Operative Findings: 


fibroid uterus was sounded to 12 cm.  Normal tubes and ovaries.


Description of Procedure: 


Patient taken the operating room where general anesthesia was obtained without 

difficulty.  She is prepped and draped in normal sterile fashion dorsal 

lithotomy position, legs placed in the Hasmukh stirrups.  Weighted speculum placed

in the vagina and the anterior lip the cervix was grasped with single-tooth 

tenaculum.  The uterus sounded to 6 cm and the cervix diameter was 2.5 cm.  The 

appropriate manipulator tip and ring were placed on the Selam manipulator.  The 

Selam manipulator was then placed in the uterus.  Redman catheter was also placed.

 Attention was then turned to the abdomen and gloves were changed.  A 5 mm 

supraumbilical incision was made the scalpel and a 5 mm optical trocar was 

placed under direct visualization.  10 cm to the right of this and 2 cm down a 5

mm incision was made and 8 mm da Yesy port was placed under direct 

visualization.  Same measurements on the opposite side of the patient's abdomen,

the 5 mm incision was made and 8 mm da Yesy port was placed under direct 

visualization.  In the left upper quadrant a 10 mm incision was made and a 10 mm

optical trocar was placed under direct visualization.  The 5 mm optical trocar 

was then replaced with the 8 mm da Yesy camera port.  The robot was docked on 

patient's right side.  The camera was introduced and then the monopolar curved 

scissor and [place] bipolar placed under direct visualization.  I broke scrub 

and went to the physician console.  The left infundibulopelvic ligament was 

cauterized with the [place] bipolar and cut with monopolar curved scissors.  

The left round ligament was cauterized with the [place] bipolar and cut with 

monopolar curved scissors.  The posterior leaf of the broad ligament was taken 

down using the monopolar curved scissors.  Anterior leaf of the broad ligament 

was then taken down using the monopolar curved scissors.  The uterine artery was

cauterized with the [place] bipolar and cut with monopolar curved scissors.  

The bladder flap was then started using the monopolar curved scissors.  

Attention was then turned to the right side of the patient's anatomy and the 

right infundibular pelvic ligament was cauterized with the [place] bipolar and 

cut with monopolar curved scissors.  The right round ligament was cauterized 

with the [place] bipolar and cut with monopolar curved scissors.  Posterior 

leaf of the broad ligament was taken down using the monopolar curved scissors 

and the anterior leaf was taken down using the monopolar curved scissors.  The 

uterine artery was cauterized the [place] bipolar cut with monopolar curved 

scissors.  The bladder flap was then finished on this side.  Anterior colpotomy 

was made using the monopolar curved scissors.  The rest of the uterus was 

 from the vaginal cuff by following the ring around with the monopolar 

curved scissors through the uterosacral ligaments back to the anterior portion. 

Once the uterus and cervix were amputated they were pulled through the vaginal 

cuff.  Hemostasis was assured.  The instruments were changed for the Cardier 

forcep and the elsy suture cut.  The vaginal cuff was then closed using 2-O 

stratafix barbed suture in a running fashion.  Hemostasis was again assured and 

the pelvis was irrigated.  All instruments were removed from the abdomen and the

robot was undocked.  I scrubbed back in to perform a cystoscopy.  There were 

jets from both ureteral orifices.  The abdominal incisions were closed with 4-0 

Vicryl in a subcuticular fashion.  Patient tolerated the procedure well, sponge 

and instrument counts correct 2 and she was taken to recovery room in stable 

condition condition

## 2022-09-08 NOTE — P.ANPRN
Procedure Note - Anesthesia





- Nerve Block Performed


  ** Bilateral Transversus Abdominis Single


Time Out Performed: Yes ([id number])


Date of Procedure: 09/08/22


Procedure Start Time: 07:06


Procedure Stop Time: 07:11


Location of Patient: PreOp


Indication: Acute Post-Operative Pain, Requested by Surgeon


Specifically requested for management of pain by DrMarci: Cyndi Scott


Sedation Type: Sedate with meaningful contact maintained


Preparation: Sterile Prep


Position: Supine


Catheter: None


Needle Types: Pajunk


Needle Gauge: 21


Ultrasound used to visualize needle placement: Yes


Ultrasound used to observe medication spread: Yes


Injectate: 0.5% Ropivacaine (see comment for volume) (15cc + 10cc nacl pf)


Blood Aspirated: No


Pain Paresthesia on Injection Noted: No


Resistance on Injection: Normal


Image Stored and Saved: Yes


Events: Uneventful and Well Tolerated

## 2022-09-09 NOTE — P.DS
Providers


Date of admission: 





09/08/2022


Expected date of discharge: 09/09/22


Attending physician: 


Cyndi Scott





Primary care physician: 


Stated None








- Discharge Diagnosis(es)


(1) Breast cancer


Current Visit: No   Status: Chronic   





(2) History of robot-assisted laparoscopic hysterectomy


Current Visit: Yes   Status: Acute   


Hospital Course: 





Patient presented for TLHBSO due to ER/VA positive breast cancer.  She underwent

this procedure without complication.  Postoperatively patient is doing very well

she denies nausea, vomiting, chest pain, shortness of breath or calf pain.  She 

is passing flatus and tolerating regular diet.  Ambulating and voiding without 

difficulty.  Patient has good pain control.  She will be discharged home 

postoperative day #1 in stable condition to follow-up with me in 3 weeks.





Plan - Discharge Summary


Discharge Rx Participant: Yes


New Discharge Prescriptions: 


New


   Ibuprofen [Motrin] 600 mg PO Q6HR PRN #30 tab


     PRN Reason: Mild Discomfort


   HYDROcodone/APAP 7.5-325MG [Norco 7.5-325] 1 each PO Q6HR PRN #12 tab


     PRN Reason: Pain





No Action


   Ubidecarenone [Co Q-10] 100 mg PO DAILY


   Vitamin K2 90 mcg PO DAILY


   Gut Support Complex 1 tab PO BID


   Herceptin 1 dose INJ Q21D


   Pertuzumab [Perjeta] 0 mg IV Q21D


   Multivitamins, Thera [Multivitamin (formulary)] 1 tab PO DAILY


   Thyroid,Pork [[place] Thyroid] 60 mg PO DAILY


   Thyroid Complex 1 tab PO DAILY


   Cholecalciferol (Vitamin D3) [Vitamin D3 (125 MCG = 5,000 IU)] 125 mcg PO 

DAILY


   Krill Oil 1,000 mg PO DAILY


   Vitamin E 400 unit PO DAILY


   Mushroom Complex 1 tab PO DAILY


Discharge Medication List





Ubidecarenone [Co Q-10] 100 mg PO DAILY 03/06/19 [History]


Thyroid Complex 1 tab PO DAILY 10/19/21 [History]


Cholecalciferol (Vitamin D3) [Vitamin D3 (125 MCG = 5,000 IU)] 125 mcg PO DAILY 

11/12/21 [History]


Gut Support Complex 1 tab PO BID 11/12/21 [History]


Vitamin K2 90 mcg PO DAILY 11/12/21 [History]


Herceptin 1 dose INJ Q21D 04/20/22 [History]


Pertuzumab [Perjeta] 0 mg IV Q21D 04/20/22 [History]


Krill Oil 1,000 mg PO DAILY 09/01/22 [History]


Multivitamins, Thera [Multivitamin (formulary)] 1 tab PO DAILY 09/01/22 

[History]


Mushroom Complex 1 tab PO DAILY 09/01/22 [History]


Thyroid,Pork [[place] Thyroid] 60 mg PO DAILY 09/01/22 [History]


Vitamin E 400 unit PO DAILY 09/01/22 [History]


HYDROcodone/APAP 7.5-325MG [Norco 7.5-325] 1 each PO Q6HR PRN #12 tab 09/09/22 

[Rx]


Ibuprofen [Motrin] 600 mg PO Q6HR PRN #30 tab 09/09/22 [Rx]








Follow up Appointment(s)/Referral(s): 


Cyndi Scott DO [Doctor of Osteopathic Medicine] - 3 Weeks


Discharge Disposition: HOME SELF-CARE

## 2022-10-14 NOTE — CA
Transthoracic Echo Report 

 Name: Starr Ureañ 

 MRN:    [id number] 

 Age:    42     Gender:     F 

 

 :    1980 

 Exam Date:     10/13/2022 13:53 

 Exam Location: [facility] 

 Ht (in):     66     Wt (lb):     162 

 Ordering Physician:        Mounika Martinez MD 

 Attending/Referring Phys: 

 Technician         Katelin Miles RDCS 

 Procedure CPT: 

 Indications:       Z01.818 Chemo 

 

 Cardiac Hx: 

 Technical Quality:      Good 

 Contrast 1:                                Total Dose (mL): 

 Contrast 2:                                Total Dose (mL): 

 

 MEASUREMENTS  (Male / Female) Normal Values 

 2D ECHO 

 LV Diastolic Diameter PLAX        4.1 cm                4.2 - 5.9 / 3.9 - 5.3 cm 

 LV Systolic Diameter PLAX         2.7 cm                 

 IVS Diastolic Thickness           1.0 cm                0.6 - 1.0 / 0.6 - 0.9 cm 

 LVPW Diastolic Thickness          1.1 cm                0.6 - 1.0 / 0.6 - 0.9 cm 

 LV Relative Wall Thickness        0.5                    

 RV Internal Dim ED PLAX           2.7 cm                 

 LA Systolic Diameter LX           2.9 cm                3.0 - 4.0 / 2.7 - 3.8 cm 

 LA Volume                         36.4 cm???              18 - 58 / 22 - 52 cm??? 

 

 M-MODE 

 Aortic Root Diameter MM           2.9 cm                 

 MV E Point Septal Separation      0.3 cm                 

 AV Cusp Separation MM             1.7 cm                 

 

 DOPPLER 

 AV Peak Velocity                  130.1 cm/s             

 AV Peak Gradient                  6.8 mmHg               

 MV Area PHT                       3.2 cm???                

 Mitral E Point Velocity           84.2 cm/s              

 Mitral A Point Velocity           64.8 cm/s              

 Mitral E to A Ratio               1.3                    

 MV Deceleration Time              235.6 ms               

 MV E' Velocity                    8.3 cm/s               

 Mitral E to MV E' Ratio           10.2                   

 

 

 FINDINGS 

 Left Ventricle 

 Left ventricular ejection fraction is estimated at 55-60 %.  Mildly increased  

 septal wall thickness. Mildly increased posterior wall thickness. Left  

 ventricular cavity size normal. 

 

 Right Ventricle 

 Normal right ventricular size and function. Unable to estimate the right  

 ventricular systolic pressure. 

 

 Right Atrium 

 Normal right atrial size. 

 

 Left Atrium 

 Normal left atrial size. No evidence for an atrial septal defect. 

 

 Mitral Valve 

 Structurally normal mitral valve. No mitral stenosis, regurgitation or  

 prolapse. 

 

 Aortic Valve 

 Trileaflet aortic valve. No aortic valve stenosis or regurgitation. 

 

 Tricuspid Valve 

 Structurally normal tricuspid valve. 

 

 Pulmonic Valve 

 Structurally normal pulmonic valve. 

 

 Pericardium 

 Normal pericardium. No pericardial effusion. 

 

 Aorta 

 Normal size aortic root and proximal ascending aorta. 

 

 CONCLUSIONS 

 Left ventricular ejection fraction 55-60% 

 No mitral regurgitation 

 No pericardial effusion 

 Global longitudinal strain -18.5 

 Previewed by:  

 Dr. Hugo Ward DO 

 (Electronically Signed) 

 Final Date:      2022 11:03

## 2022-10-21 NOTE — MM
Reason for Exam: Additional evaluation requested from prior study. 

Last screening mammogram was performed 11 month(s) ago.





Patient History: 

Menarche at age 13. First Full-Term Pregnancy at age 21. Hysterectomy at age 42. Other cancer, age

17. Breast cancer, left, age 41. Patient used Hormonal Contraceptives for 2 years. 04/22/2022,

Lumpectomy on the Left side. 1996, Benign Excisional Biopsy on the left side. Malignant Core Biopsy.

4/22/2022, Malignant Core Biopsy on the left side. 11/3/2021, Benign Core Biopsy on the left side.

11/3/2021, Benign Core Biopsy on the right side. 10/28/2021, Malignant Core Biopsy on the left side.

10/28/2021, Malignant Core Biopsy on the left side.

Maternal grandmother had breast cancer. Mother had breast cancer. 





Prior Study Comparison: 

2/20/1998 Left Diagnostic Ultrasound, [facility]. 2/10/1999 Bilateral Diagnostic Ultrasound, [facility]. 1/24/2000

Bilateral Diagnostic Mammogram, [facility]. 9/19/2014 Bilateral Diagnostic Mammogram, [facility]. 2/28/2017

Bilateral Screening Mammogram, [facility]. 10/19/2021 Bilateral Diagnostic Mammogram, [facility]. 10/19/2021

Bilateral Diagnostic Ultrasound, [facility]. 10/28/2021 Left Diagnostic Mammogram, [facility]. 11/3/2021 Bilateral

Diagnostic Mammogram, [facility]. 6/10/2022 Left US breast LT, [facility]. 





Tissue Density: 

The breast tissue is heterogeneously dense. This may lower the sensitivity of mammography.





Findings: 

Analyzed By CAD. 

Biopsy clip in the right breast is redemonstrated lower inner aspect. There is partial visualization

of Mediport catheter towards the right axilla on current study. Biopsy clip outer aspect left breast

again seen. There is new distortion with surgical clips and overlying skin thickening in the left

breast middle depth central upper aspect presumed posttreatment change. No new mass or suspicious

group of microcalcification in either  breast. 





Overall Assessment: Probably benign, BI-RAD 3





Management: 

Diagnostic Mammogram of the left breast in 6 months.

Presumed new baseline left breast.  Results were given to the patient verbally at the time of exam.



Electronically signed and approved by: Ganesh Renee M.D.

## 2022-12-15 NOTE — P.GSHP
History of Present Illness


H&P Date: 22


Chief Complaint: Colon cancer screening, breast cancer





42-year-old female coming in for colonoscopy and Port-A-Cath removal.  Patient 

with family history of colon cancer in her mother, maternal grandmother, and 

maternal grandfather.  Patient with history of left breast cancer and underwent 

lumpectomy in April.  No bowel complaints currently.





Past Medical History


Past Medical History: Cancer, Thyroid Disorder


Additional Past Medical History / Comment(s): Migraines, Hypothyroid. Left 

breast cancer diagnosed ,  last chemo beginning of Dec. 2022


History of Any Multi-Drug Resistant Organisms: None Reported


Past Surgical History: Breast Surgery,  Section, Hysterectomy, Tubal 

Ligation


Additional Past Surgical History / Comment(s): Left breast biopsy X2, left 

lumpectomy w/lymph node removal, D&C, cryotherapy of the cervix , port a 

cath insertion


Past Anesthesia/Blood Transfusion Reactions: No Reported Reaction


Smoking Status: Never smoker





- Past Family History


  ** Mother


Family Medical History: Cancer, Diabetes Mellitus


Additional Family Medical History / Comment(s): Breast cancer, colon cancer, 

cervical cancer and diabetes. Grandmother had leukemia, colon cancer, and breast

cancer.





  ** Sister(s)


Family Medical History: Diabetes Mellitus





Medications and Allergies


                                Home Medications











 Medication  Instructions  Recorded  Confirmed  Type


 


Ubidecarenone [Co Q-10] 100 mg PO DAILY 19 History


 


Thyroid Complex 1 tab PO DAILY 10/19/21 12/14/22 History


 


Gut Support Complex 1 tab PO BID 21 History


 


Krill Oil 1,000 mg PO DAILY 22 History


 


Multivitamins, Thera [Multivitamin 1 tab PO DAILY 22 History





(formulary)]    


 


Mushroom Complex 1 tab PO DAILY 22 History


 


Thyroid,Pork [Browder Thyroid] 90 mg PO DAILY 22 History


 


Vitamin E 400 unit PO DAILY 22 History


 


Vitamin D3/Vitamin K2 (Mk4) 1 each PO DAILY 22 History





[Vitamin K2 Plus D3 Tablet]    








                                    Allergies











Allergy/AdvReac Type Severity Reaction Status Date / Time


 


No Known Allergies Allergy   Verified 22 06:11














Surgical - Exam








Physical exam:


General: Well-developed, well-nourished


HEENT: Normocephalic, sclerae nonicteric


Abdomen: Nontender, nondistended


Extremities: No edema


Neuro: Alert and oriented





Assessment and Plan


(1) Breast cancer


Narrative/Plan: 


42-year-old female with breast cancer and family history of colon cancer.  We'll

proceed with Port-A-Cath removal and colonoscopy tomorrow.  Risks of bleeding, 

infection, perforation reviewed.  She understands and wishes to proceed.


Status: Chronic   Code(s): C50.919 - MALIGNANT NEOPLASM OF UNSP SITE OF 

UNSPECIFIED FEMALE BREAST   SNOMED Code(s): 528219935

## 2022-12-16 NOTE — P.OP
Date of Procedure: 12/16/22


Procedure(s) Performed: 


PREOPERATIVE DIAGNOSIS: Breast cancer, colon cancer screening with family 

history of colon cancer


POSTOPERATIVE DIAGNOSIS: Same, normal colon


PROCEDURE: Port-A-Cath removal, colonoscopy


SURGEON: Brad


EBL: Minimal


ANESTHESIA: Sedation and local


COMPLICATIONS: None


OPERATIVE PROCEDURE: Patient was placed in the supine position.  The patient was

sedated per anesthesia that time.  The chest was prepped and draped in the usual

sterile fashion.  The skin was localized with Marcaine solution.  The previous 

incision was re-incised using a scalpel.  The port was easily excised using 

accommodation of blunt dissection sharp dissection and electrocautery.  The 

subcutaneous tissues were reapproximated using 3-0 Vicryl sutures.  The skin was

reapproximated using 4-0 Monocryl sutures.  Skin glue was then applied.  The 

patient was then placed in the left decubitus position.  The Olympus colonoscope

was inserted into the anus and passed under direct visualization to the base of 

the cecum.  The appendiceal orifice was visualized.  From that point the scope 

was slowly withdrawn inspecting all surfaces carefully.  There were no 

neoplastic inflammatory or polypoid lesions throughout the cecum, ascending, 

transverse, descending, sigmoid and rectum.  There was no visible diverticulosis

noted.  Digital rectal examination was normal.  The patient was taken to the 

recovery room in stable condition per anesthesia guidelines.

## 2023-10-25 NOTE — MM
Reason for Exam: Screening  (asymptomatic). 

Last screening mammogram was performed 12 month(s) ago.





Patient History: 

Menarche at age 13. First Full-Term Pregnancy at age 21. Hysterectomy at age 42. Other cancer, age

17. Breast cancer, left, age 41. Patient used Hormonal Contraceptives for 2 years. 04/22/2022,

Lumpectomy on the Left side. 1996, Benign Excisional Biopsy on the left side. Malignant Core Biopsy.

4/22/2022, Malignant Core Biopsy on the left side. 11/3/2021, Benign Core Biopsy on the left side.

11/3/2021, Benign Core Biopsy on the right side. 10/28/2021, Malignant Core Biopsy on the left side.

10/28/2021, Malignant Core Biopsy on the left side.

Maternal grandmother had breast cancer. Mother had breast cancer. 





Prior Study Comparison: 

10/28/2021 Left Diagnostic Mammogram, [facility]. 11/3/2021 Bilateral Diagnostic Mammogram, [facility]. 10/20/2022

Bilateral MG 3D diag mammo w/cad REED, [facility]. 





Tissue Density: 

The breast tissue is heterogeneously dense. This may lower the sensitivity of mammography.





Findings: 

Analyzed By CAD. 

There is no suspicious group of microcalcifications or new suspicious mass in either breast. Stable

postoperative changes left breast. 





Overall Assessment: Benign, BI-RAD 2





Management: 

Screening Mammogram of both breasts in 1 year.

.



Patient should continue monthly self-breast exams.  A clinical breast exam by your physician is

recommended on an annual basis.

This exam should not preclude additional follow-up of suspicious palpable abnormalities.



Note on Kimberly scores and lifetime risk:

1. A Kimberly score greater than 3% is considered moderate risk. If this is the case, consider

specialist referral to assess eligibility for a risk reducing agent.

2. If overall lifetime risk for the development of breast cancer is 20% or higher, the patient may

qualify for future screening with alternating mammogram and breast MRI.



Electronically signed and approved by: Leopold M. Fregoli, M.D. Radiologis